# Patient Record
Sex: MALE | Race: OTHER | NOT HISPANIC OR LATINO | URBAN - METROPOLITAN AREA
[De-identification: names, ages, dates, MRNs, and addresses within clinical notes are randomized per-mention and may not be internally consistent; named-entity substitution may affect disease eponyms.]

---

## 2019-07-01 ENCOUNTER — EMERGENCY (EMERGENCY)
Facility: HOSPITAL | Age: 29
LOS: 1 days | Discharge: HOME | End: 2019-07-01
Attending: EMERGENCY MEDICINE
Payer: MEDICAID

## 2019-07-01 VITALS
OXYGEN SATURATION: 98 % | RESPIRATION RATE: 16 BRPM | HEART RATE: 68 BPM | HEIGHT: 66 IN | DIASTOLIC BLOOD PRESSURE: 70 MMHG | TEMPERATURE: 98 F | WEIGHT: 160.06 LBS | SYSTOLIC BLOOD PRESSURE: 125 MMHG

## 2019-07-01 PROCEDURE — 99283 EMERGENCY DEPT VISIT LOW MDM: CPT

## 2019-07-01 RX ORDER — FLUORESCEIN SODIUM 9 MG
1 STRIP OPHTHALMIC (EYE) ONCE
Refills: 0 | Status: COMPLETED | OUTPATIENT
Start: 2019-07-01 | End: 2019-07-01

## 2019-07-01 RX ADMIN — Medication 1 APPLICATION(S): at 20:16

## 2019-07-01 NOTE — ED PROVIDER NOTE - ATTENDING CONTRIBUTION TO CARE
I completed an independent physical examination.   I have signed out the follow up of any pending tests (i.e. labs, radiological studies) to the PA/NP.  I have discussed the patient’s plan of care and disposition with the PA/NP    Patient presenting with FB sensation in L eye. No obvious foreign body or abrasion. Will dc with eye drops and return precautions.

## 2019-07-01 NOTE — ED PROVIDER NOTE - PHYSICAL EXAMINATION
left eye injected, no exudates,     Fluorescein/woods lamp exam- no corneal abrasio noted, no FB noted, flushed eyes with NS.

## 2019-07-01 NOTE — ED ADULT NURSE NOTE - OBJECTIVE STATEMENT
Pt c/o foreign body to left eye since morning, with slight pain, no tearing seen. Denies blurry vision. No acute distress noted. Safety maintained.

## 2019-07-01 NOTE — ED PROVIDER NOTE - OBJECTIVE STATEMENT
28 yo male with no PMHx presenting to ED with complaints of FB left eye. Patient states this morning something flew in his eye, all day has been itchy and tearing. Denies fever, crusting or viral illness.

## 2021-09-06 ENCOUNTER — OUTPATIENT (OUTPATIENT)
Dept: OUTPATIENT SERVICES | Facility: HOSPITAL | Age: 31
LOS: 1 days | End: 2021-09-06
Payer: MEDICAID

## 2021-09-06 DIAGNOSIS — Z11.52 ENCOUNTER FOR SCREENING FOR COVID-19: ICD-10-CM

## 2021-09-06 PROBLEM — Z78.9 OTHER SPECIFIED HEALTH STATUS: Chronic | Status: ACTIVE | Noted: 2019-07-01

## 2021-09-06 LAB — SARS-COV-2 RNA SPEC QL NAA+PROBE: SIGNIFICANT CHANGE UP

## 2021-09-06 PROCEDURE — 87635 SARS-COV-2 COVID-19 AMP PRB: CPT

## 2022-03-24 NOTE — ED PROVIDER NOTE - PMH
Garret Patton,  I'll call Mother as well. Thank you for your help!   Jg Portillos No pertinent past medical history <<----- Click to add NO pertinent Past Medical History

## 2024-06-05 NOTE — ED ADULT TRIAGE NOTE - LOCATION:
Patient's girlfriend call in to get an appointment for him.  His complaint was tingling in hands and arm.  I told patient this is something he should be seen in the ED for due to it could be a cause of multiple things. Patient refused and wanted to schedule appointment. Appointment was scheduled for June 24th which was the first available. So I would rather the nurse call and follow up to see if you can get him in the office sooner or recommend him for other options.   Please call Mr. Rodriguez @  [phone]   Left arm;